# Patient Record
Sex: FEMALE | Race: WHITE | NOT HISPANIC OR LATINO | Employment: UNEMPLOYED | URBAN - METROPOLITAN AREA
[De-identification: names, ages, dates, MRNs, and addresses within clinical notes are randomized per-mention and may not be internally consistent; named-entity substitution may affect disease eponyms.]

---

## 2021-11-11 LAB
INFLUENZA A ANTIGEN, POC: NEGATIVE
INFLUENZA B ANTIGEN, POC: NEGATIVE
RAPID GROUP A STREP (OHS): NEGATIVE
SARS-COV-2 RNA RESP QL NAA+PROBE: NEGATIVE

## 2021-11-14 ENCOUNTER — HISTORICAL (OUTPATIENT)
Dept: ADMINISTRATIVE | Facility: HOSPITAL | Age: 41
End: 2021-11-14

## 2021-11-14 LAB — SARS-COV-2 RNA RESP QL NAA+PROBE: NEGATIVE

## 2022-04-10 ENCOUNTER — HISTORICAL (OUTPATIENT)
Dept: ADMINISTRATIVE | Facility: HOSPITAL | Age: 42
End: 2022-04-10

## 2022-04-29 VITALS
BODY MASS INDEX: 28.41 KG/M2 | HEIGHT: 69 IN | DIASTOLIC BLOOD PRESSURE: 69 MMHG | OXYGEN SATURATION: 99 % | WEIGHT: 191.81 LBS | SYSTOLIC BLOOD PRESSURE: 118 MMHG

## 2022-06-22 DIAGNOSIS — M25.561 RIGHT KNEE PAIN, UNSPECIFIED CHRONICITY: Primary | ICD-10-CM

## 2022-08-04 ENCOUNTER — OFFICE VISIT (OUTPATIENT)
Dept: ORTHOPEDICS | Facility: CLINIC | Age: 42
End: 2022-08-04
Payer: MEDICAID

## 2022-08-04 ENCOUNTER — HOSPITAL ENCOUNTER (OUTPATIENT)
Dept: RADIOLOGY | Facility: HOSPITAL | Age: 42
Discharge: HOME | End: 2022-08-04
Attending: NURSE PRACTITIONER
Payer: MEDICAID

## 2022-08-04 VITALS — HEIGHT: 69 IN | WEIGHT: 202.63 LBS | BODY MASS INDEX: 30.01 KG/M2

## 2022-08-04 DIAGNOSIS — M25.561 RIGHT KNEE PAIN, UNSPECIFIED CHRONICITY: ICD-10-CM

## 2022-08-04 DIAGNOSIS — M22.2X1 PATELLOFEMORAL PAIN SYNDROME OF RIGHT KNEE: Primary | ICD-10-CM

## 2022-08-04 DIAGNOSIS — M22.8X1 PATELLAR TRACKING DISORDER OF RIGHT KNEE: ICD-10-CM

## 2022-08-04 PROCEDURE — 3008F PR BODY MASS INDEX (BMI) DOCUMENTED: ICD-10-PCS | Mod: CPTII,,, | Performed by: NURSE PRACTITIONER

## 2022-08-04 PROCEDURE — 1160F PR REVIEW ALL MEDS BY PRESCRIBER/CLIN PHARMACIST DOCUMENTED: ICD-10-PCS | Mod: CPTII,,, | Performed by: NURSE PRACTITIONER

## 2022-08-04 PROCEDURE — 73564 X-RAY EXAM KNEE 4 OR MORE: CPT | Mod: TC,RT

## 2022-08-04 PROCEDURE — 1159F PR MEDICATION LIST DOCUMENTED IN MEDICAL RECORD: ICD-10-PCS | Mod: CPTII,,, | Performed by: NURSE PRACTITIONER

## 2022-08-04 PROCEDURE — 99213 OFFICE O/P EST LOW 20 MIN: CPT | Mod: PBBFAC | Performed by: NURSE PRACTITIONER

## 2022-08-04 PROCEDURE — 1159F MED LIST DOCD IN RCRD: CPT | Mod: CPTII,,, | Performed by: NURSE PRACTITIONER

## 2022-08-04 PROCEDURE — 3008F BODY MASS INDEX DOCD: CPT | Mod: CPTII,,, | Performed by: NURSE PRACTITIONER

## 2022-08-04 PROCEDURE — 99204 PR OFFICE/OUTPT VISIT, NEW, LEVL IV, 45-59 MIN: ICD-10-PCS | Mod: S$PBB,,, | Performed by: NURSE PRACTITIONER

## 2022-08-04 PROCEDURE — 1160F RVW MEDS BY RX/DR IN RCRD: CPT | Mod: CPTII,,, | Performed by: NURSE PRACTITIONER

## 2022-08-04 PROCEDURE — 99204 OFFICE O/P NEW MOD 45 MIN: CPT | Mod: S$PBB,,, | Performed by: NURSE PRACTITIONER

## 2022-08-04 RX ORDER — DICLOFENAC SODIUM 10 MG/G
4 GEL TOPICAL 3 TIMES DAILY
Qty: 100 G | Refills: 2 | Status: SHIPPED | OUTPATIENT
Start: 2022-08-04 | End: 2022-09-03

## 2022-08-04 NOTE — PROGRESS NOTES
"  Subjective:       New pt   Patient ID: Tammy Juarez is a 42 y.o. female. Non-smoker. Employment HX: , currently employed.    Seen [facility] ortho for same DX since n/a.   Chief Complaint: Pain of the Right Knee    HPI:    Left aching and shooting anterior knee pain.   Injury: twisted while running on uneven ground  Onset: several months ago fluctuates   Modifying Factors: worse with activity and improves with rest  Associated Symptoms: crepitus, decreased ROM and "popping"   Activity: pain mildly interferes with ADLs  and active lifestyle with exercise  Previous Treatments:  RX NSAIDs with some relief but symptoms returning  PMH: negative for contraindications for NSAID use  Family History: - OA    Note: Referred for right knee pain after twist injury without conservative treatments to date.     Current Outpatient Medications:     diclofenac sodium (VOLTAREN) 1 % Gel, Apply 4 g topically 3 (three) times daily., Disp: 100 g, Rfl: 2  Review of patient's allergies indicates:   Allergen Reactions    Penicillins Rash     No results found for: HGBA1C   Body mass index is 29.92 kg/m².   Vitals:    08/04/22 1019 08/04/22 1020   Weight: 91.9 kg (202 lb 9.6 oz)    Height: 5' 9" (1.753 m)    PainSc:   3   3      Review of Systems:  A ten-point review of systems was performed and is negative, except as mentioned above   Objective:   MSK Bilateral Knee  General:  apparent distress, no pain indicators, obesity  Inspection: No limping gait, RIGHT mild effusion, full weight bearing, no erythema, no contusion, RIGHT: mild quad deconditioning, varus deformity, anterior pelvic tilt with knee/hindfoot valgus when standing, and lateral patellar tracking with sitting  Palpation: RIGHT knee tenderness with peripatellar soft tissue and mild tenderness to patellar tendon, non-tender medial and lateral joint lines, quad tendon and tibial plateau  ROM:     Active Flexion / Extension (0-140):  0 / 124 painful ( R )  0 / 135 " non-painful ( L )  Strength:    Flexion  5 / 5 (R)   5 / 5 (L)   Extension  5 / 5 (R)   5 / 5 (L)  Special Testing:   IT Band Testing  o Clarisa's Test:    + (R)  -- (L)   Effusion Testing  o Ballotable Effusion:   -- (R)  -- (L)  o Fluid Wave:    -- (R)  -- (L)   Patellar Testing  o Patellar Grind:    -- (R)  -- (L)  o Patellar Facet Pain:   + (R)  -- (L)  o Apprehension:    + (R)  -- (L)  o Deep Squat  +  (R)  -- (L)   Meniscal Testing  o Abby's test:     -- (R)  -- (L)  o Thessaly's Test:     -- (R)  -- (L)   Ligament Testing  o ACL Anterior Drawer:   -- (R) -- (L)  o PCL Posterior Drawer:   -- (R) -- (L)  o LCL Varus Test:    -- (R) -- (L)  o MCL Valgus Test:   -- (R) -- (L)  Hip Exam normal  Ankle Exam normal  Neurovascular: Intact to light touch  Neuro/ Psych: Awake, alert, oriented, normal mood and affect  Lymphatic: No LAD  Skin/ Soft Tissue: no rash, skin intact  Cardio: no edema, vascular integrity noted  Resp: no increased respiratory effort noted  Assessment:       Imaging: X-ray 4 views of right knee ordered, reviewed and independently interpreted by me. Discussed with patient. No acute findings .    XR KNEE COMP 4 OR MORE VIEWS RIGHT 8/4/22  CLINICAL HISTORY:  Pain in right knee  COMPARISON:  None.  FINDINGS:  No acute displaced fractures or dislocations.  There is some narrowing of the medial compartment of the knee joint articular spaces are otherwise preserved with smooth articular surface surfaces  No blastic or lytic lesions.  Small suprapatellar effusion  Impression:  Degenerative changes.  Small suprapatellar effusion    EMR Review: completed with noted Referral documentation reviewed    1. 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 BMI 29.0-29.9,adult    4. Right knee pain, unspecified chronicity      Plan:       1. Ongoing education about DX and treatment recommendations including conservative treatments of daily HEP with TheraBand,  BMI reduction goal 5-10% of body weight (170# overall goal), muscle strengthening with use of stationary bike (RPM set at 80 or > with slow progression to goal of 40 minutes 3-4 times per week as tolerated), adequate vit D/C, glucosamine 1500 mg/day and daily acetaminophen 1000 mg 3 times/ day if able to tolerate.    2. Treatment plan: Mild knee arthritis with patellar tracking syndrome/ patellar-femorale syndrome Recommend starting initial conservative treatments including: soft knee splint, RX topical NSAID , HEP with TheraBand > 6 weeks and formal RX PT, instructed to contact insurance for provider.  If inadequate at f/u will consider VS injections.   3. Procedure: CSI v. VS injections discussed as treatment options in future if conservative treatments fail.   4. RX Medications: continue medications as RX per PCP; RX topical diclofenac as directed, medication precautions given.   5. RTC: 6 weeks, telemedicine visit    Toña REY    Timed Billing Note  Total Time Spent with Patient: 45 minutes  Visit Start Time: 1045  10 minutes spent prior to exam reviewing EMR, prior labs and x-rays.  25 minutes spent in exam with patient completing exam, obtaining history, educating on DX and treatment plan.  10 minutes spent after exam completing EMR documentation.   Visit End Time: 2790

## 2022-09-16 ENCOUNTER — HISTORICAL (OUTPATIENT)
Dept: ADMINISTRATIVE | Facility: HOSPITAL | Age: 42
End: 2022-09-16
Payer: MEDICAID

## 2022-09-22 ENCOUNTER — OFFICE VISIT (OUTPATIENT)
Dept: ORTHOPEDICS | Facility: CLINIC | Age: 42
End: 2022-09-22
Payer: MEDICAID

## 2022-09-22 VITALS — WEIGHT: 198.63 LBS | BODY MASS INDEX: 27.81 KG/M2 | HEIGHT: 71 IN

## 2022-09-22 DIAGNOSIS — M23.91 ACUTE INTERNAL DERANGEMENT OF RIGHT KNEE: Primary | ICD-10-CM

## 2022-09-22 DIAGNOSIS — M22.8X1 PATELLAR MALTRACKING, RIGHT: ICD-10-CM

## 2022-09-22 PROCEDURE — 3008F PR BODY MASS INDEX (BMI) DOCUMENTED: ICD-10-PCS | Mod: CPTII,,, | Performed by: NURSE PRACTITIONER

## 2022-09-22 PROCEDURE — 1160F PR REVIEW ALL MEDS BY PRESCRIBER/CLIN PHARMACIST DOCUMENTED: ICD-10-PCS | Mod: CPTII,,, | Performed by: NURSE PRACTITIONER

## 2022-09-22 PROCEDURE — 1159F MED LIST DOCD IN RCRD: CPT | Mod: CPTII,,, | Performed by: NURSE PRACTITIONER

## 2022-09-22 PROCEDURE — 1160F RVW MEDS BY RX/DR IN RCRD: CPT | Mod: CPTII,,, | Performed by: NURSE PRACTITIONER

## 2022-09-22 PROCEDURE — 99213 OFFICE O/P EST LOW 20 MIN: CPT | Mod: PBBFAC | Performed by: NURSE PRACTITIONER

## 2022-09-22 PROCEDURE — 1159F PR MEDICATION LIST DOCUMENTED IN MEDICAL RECORD: ICD-10-PCS | Mod: CPTII,,, | Performed by: NURSE PRACTITIONER

## 2022-09-22 PROCEDURE — 99214 PR OFFICE/OUTPT VISIT, EST, LEVL IV, 30-39 MIN: ICD-10-PCS | Mod: S$PBB,,, | Performed by: NURSE PRACTITIONER

## 2022-09-22 PROCEDURE — 99214 OFFICE O/P EST MOD 30 MIN: CPT | Mod: S$PBB,,, | Performed by: NURSE PRACTITIONER

## 2022-09-22 PROCEDURE — 3008F BODY MASS INDEX DOCD: CPT | Mod: CPTII,,, | Performed by: NURSE PRACTITIONER

## 2022-09-22 NOTE — PROGRESS NOTES
"  Subjective:       Follow up   Patient ID: Tammy Juarez is a 42 y.o. female. Non-smoker. Employment HX: , currently employed.    Seen [facility] ortho for same DX since n/a.   Chief Complaint: Pain of the Right Knee    HPI:    Left aching and shooting anterior knee pain.   Injury:  twisted while running on uneven ground  Onset: several months ago fluctuates   Modifying Factors: worse with activity and improves with rest  Associated Symptoms: crepitus, decreased ROM and "popping"   Activity: pain mildly interferes with ADLs  and active lifestyle with exercise  Previous Treatments:  RX NSAIDs with some relief but symptoms returning  PMH: negative for contraindications for NSAID use  Family History: - OA    Note: Referred for right knee pain after twist injury without conservative treatments to date.     Current Outpatient Medications:     diclofenac sodium (VOLTAREN) 1 % Gel, Apply 4 g topically 3 (three) times daily., Disp: 100 g, Rfl: 2  Review of patient's allergies indicates:   Allergen Reactions    Penicillins Rash     No results found for: HGBA1C   Body mass index is 27.7 kg/m².   Vitals:    09/22/22 0944 09/22/22 0945   Weight: 90.1 kg (198 lb 9.6 oz)    Height: 5' 11" (1.803 m)    PainSc:    5      Review of Systems:  A ten-point review of systems was performed and is negative, except as mentioned above   Objective:   MSK Bilateral Knee  General:  apparent distress, no pain indicators, obesity  Inspection: No limping gait, RIGHT mild effusion, full weight bearing, no erythema, no contusion, RIGHT: mild quad deconditioning, varus deformity, anterior pelvic tilt with knee/hindfoot valgus when standing, and lateral patellar tracking with sitting  Palpation: RIGHT knee tenderness with peripatellar soft tissue and mild tenderness to patellar tendon, non-tender medial and lateral joint lines, quad tendon and tibial plateau  ROM:    Active Flexion / Extension (0-140):  0 / 124 painful ( R )  0 / 135 " non-painful ( L )  Strength:   Flexion  5 / 5 (R)   5 / 5 (L)  Extension  5 / 5 (R)   5 / 5 (L)  Special Testing:  IT Band Testing  Clarisa's Test:    + (R)  -- (L)  Effusion Testing  Ballotable Effusion:   + (R)  -- (L)  Fluid Wave:    + (R)  -- (L)  Patellar Testing  Patellar Grind:    -- (R)  -- (L)  Patellar Facet Pain:   + (R)  -- (L)  Apprehension:    + (R)  -- (L)  Deep Squat  +  (R)  -- (L)  Meniscal Testing  Abby's test:     -- (R)  -- (L)  Thessaly's Test:     -- (R)  -- (L)  Ligament Testing  ACL Anterior Drawer:   -- (R) -- (L)  PCL Posterior Drawer:   -- (R) -- (L)  LCL Varus Test:    -- (R) -- (L)  MCL Valgus Test:   -- (R) -- (L)  Hip Exam normal  Ankle Exam normal  Neurovascular: Intact to light touch  Neuro/ Psych: Awake, alert, oriented, normal mood and affect  Lymphatic: No LAD  Skin/ Soft Tissue: no rash, skin intact  Assessment:       Imaging: X-ray 4 views of right knee ordered, reviewed and independently interpreted by me. Discussed with patient. No acute findings .    XR KNEE COMP 4 OR MORE VIEWS RIGHT 8/4/22  CLINICAL HISTORY:  Pain in right knee  COMPARISON:  None.  FINDINGS:  No acute displaced fractures or dislocations.  There is some narrowing of the medial compartment of the knee joint articular spaces are otherwise preserved with smooth articular surface surfaces  No blastic or lytic lesions.  Small suprapatellar effusion  Impression:  Degenerative changes.  Small suprapatellar effusion    EMR Review: completed with noted prior visit 8/4/22 reviewed.    1. Acute internal derangement of right knee    2. BMI 27.0-27.9,adult      Plan:       Ongoing education about DX and treatment recommendations including conservative treatments of daily HEP with TheraBand, BMI reduction goal 5-10% of body weight (170# overall goal), muscle strengthening with use of stationary bike (RPM set at 80 or > with slow progression to goal of 40 minutes 3-4 times per week as tolerated), adequate vit D/C,  glucosamine 1500 mg/day and daily acetaminophen 1000 mg 3 times/ day if able to tolerate.    Treatment plan: Mild knee arthritis with patellar tracking syndrome/ patellar-femorale syndrome MRI ordered s/t failed conservative treatments including: RX NSAID, formal RX PT, HEP > 3 months, and knee bracing with inadequate relief.  Patient continues with symptoms of meniscal injury with chronic effusion despite > 6 weeks treatment.  MRI required to evaluate need for surgical treatments.    Procedure: n/a  RX Medications: continue medications as RX per PCP  RTC: after imaging complete    Toña REY    Timed Billing Note  Total Time Spent with Patient: 30 minutes  Visit Start Time: 0945  10 minutes spent prior to visit reviewing EMR, prior labs and x-rays.  10 minutes spent in visit with patient completing exam, obtaining history, educating on DX and treatment plan.  10 minutes spent after visit completing EMR documentation.   Visit End Time: 1015

## 2022-10-19 ENCOUNTER — OFFICE VISIT (OUTPATIENT)
Dept: ORTHOPEDICS | Facility: CLINIC | Age: 42
End: 2022-10-19
Payer: MEDICAID

## 2022-10-19 VITALS — BODY MASS INDEX: 28.6 KG/M2 | WEIGHT: 199.81 LBS | HEIGHT: 70 IN

## 2022-10-19 DIAGNOSIS — M17.11 PRIMARY OSTEOARTHRITIS OF RIGHT KNEE: ICD-10-CM

## 2022-10-19 DIAGNOSIS — M67.51 PLICA OF KNEE, RIGHT: Primary | ICD-10-CM

## 2022-10-19 PROCEDURE — 1159F PR MEDICATION LIST DOCUMENTED IN MEDICAL RECORD: ICD-10-PCS | Mod: CPTII,,, | Performed by: NURSE PRACTITIONER

## 2022-10-19 PROCEDURE — 1160F PR REVIEW ALL MEDS BY PRESCRIBER/CLIN PHARMACIST DOCUMENTED: ICD-10-PCS | Mod: CPTII,,, | Performed by: NURSE PRACTITIONER

## 2022-10-19 PROCEDURE — 99214 OFFICE O/P EST MOD 30 MIN: CPT | Mod: S$PBB,,, | Performed by: NURSE PRACTITIONER

## 2022-10-19 PROCEDURE — 99213 OFFICE O/P EST LOW 20 MIN: CPT | Mod: PBBFAC | Performed by: NURSE PRACTITIONER

## 2022-10-19 PROCEDURE — 99214 PR OFFICE/OUTPT VISIT, EST, LEVL IV, 30-39 MIN: ICD-10-PCS | Mod: S$PBB,,, | Performed by: NURSE PRACTITIONER

## 2022-10-19 PROCEDURE — 1159F MED LIST DOCD IN RCRD: CPT | Mod: CPTII,,, | Performed by: NURSE PRACTITIONER

## 2022-10-19 PROCEDURE — 1160F RVW MEDS BY RX/DR IN RCRD: CPT | Mod: CPTII,,, | Performed by: NURSE PRACTITIONER

## 2022-10-19 NOTE — PROGRESS NOTES
"  Subjective:     -----  Follow up here for MRI results.   Patient ID: Tammy Juarez is a 42 y.o. female. Non-smoker. Employment HX: , currently employed.    Seen [facility] ortho for same DX since n/a.   Chief Complaint: Pain of the Right Knee    HPI:    Left aching and shooting anterior knee pain.   Injury:  twisted while running on uneven ground  Onset: several months ago fluctuates   Modifying Factors: worse with activity and improves with rest  Associated Symptoms: crepitus, decreased ROM and "popping"   Activity: pain mildly interferes with ADLs  and active lifestyle with exercise  Previous Treatments:  soft knee splint, HEP withTheraBand, RX NSAIDs, and RX PT completed 5 wks/ 2 xs per week d/c'd 9/14/22  with some relief but symptoms returning  PMH: negative for contraindications for NSAID use  Family History: - OA    Note: Referred for right knee pain after twist injury without conservative treatments to date.     Current Outpatient Medications:     diclofenac sodium (VOLTAREN) 1 % Gel, Apply 4 g topically 3 (three) times daily., Disp: 100 g, Rfl: 2  Review of patient's allergies indicates:   Allergen Reactions    Penicillins Rash     No results found for: HGBA1C   Body mass index is 28.67 kg/m².   Vitals:    10/19/22 1356   Weight: 90.6 kg (199 lb 12.8 oz)   Height: 5' 10" (1.778 m)   PainSc:   2      Review of Systems:  A ten-point review of systems was performed and is negative, except as mentioned above   Objective:   MSK Bilateral Knee  General:  apparent distress, no pain indicators, obesity  Inspection: No limping gait, no effusion, full weight bearing, no erythema, no contusion, no quad deconditioning, varus deformity and lateral patellar tracking with sitting  Palpation: RIGHT knee tenderness with peripatellar soft tissue and mild tenderness to patellar tendon, non-tender medial and lateral joint lines, quad tendon and tibial plateau  ROM:    Active Flexion / Extension (0-140):  0 / 124 " painful ( R )  0 / 135 non-painful ( L )  Strength:   Flexion  5 / 5 (R)   5 / 5 (L)  Extension  5 / 5 (R)   5 / 5 (L)  Special Testing:  IT Band Testing  Clarisa's Test:    + (R)  -- (L)  Effusion Testing  Ballotable Effusion:   -- (R)  -- (L)  Fluid Wave:    -- (R)  -- (L)  Patellar Testing  Patellar Grind:    -- (R)  -- (L)  Patellar Facet Pain:   + (R)  -- (L)  Apprehension:    + (R)  -- (L)  Deep Squat  +  (R)  -- (L)  Meniscal Testing  Abby's test:     -- (R)  -- (L)  Thessaly's Test:     -- (R)  -- (L)  Ligament Testing  ACL Anterior Drawer:   -- (R) -- (L)  PCL Posterior Drawer:   -- (R) -- (L)  LCL Varus Test:    -- (R) -- (L)  MCL Valgus Test:   -- (R) -- (L)  Hip Exam normal  Ankle Exam normal  Neurovascular: Intact to light touch  Neuro/ Psych: Awake, alert, oriented, normal mood and affect  Lymphatic: No LAD  Skin/ Soft Tissue: no rash, skin intact  Assessment:       Imaging: X-ray 4 views of right knee ordered, reviewed and independently interpreted by me. Discussed with patient. No acute findings .    XR KNEE COMP 4 OR MORE VIEWS RIGHT 8/4/22  CLINICAL HISTORY:  Pain in right knee  COMPARISON:  None.  FINDINGS:  No acute displaced fractures or dislocations.  There is some narrowing of the medial compartment of the knee joint articular spaces are otherwise preserved with smooth articular surface surfaces  No blastic or lytic lesions.  Small suprapatellar effusion  Impression:  Degenerative changes.  Small suprapatellar effusion    EMR Review: completed with noted prior visit 8/4/22 reviewed.    1. Plica of knee, right    2. Primary osteoarthritis of right knee    3. BMI 28.0-28.9,adult      Plan:       Ongoing education about DX and treatment recommendations including conservative treatments of daily HEP with TheraBand, BMI reduction goal 5-10% of body weight (180# overall goal), muscle strengthening with use of stationary bike (RPM set at 80 or > with slow progression to goal of 40 minutes 3-4 times  per week as tolerated), adequate vit D/C, glucosamine 1500 mg/day and daily acetaminophen 1000 mg 3 times/ day if able to tolerate.    Treatment plan: Mild knee arthritis Right  ortho res referral at patient request. Declines conservative treatments.     Procedure: n/a  RX Medications: continue medications as RX per PCP  RTC: next available appt. with ortho res.   NOTE: None    Toña REY    Timed Billing Note  Total Time Spent with Patient: 30 minutes .  Visit Start Time: 1445  10 minutes spent prior to visit reviewing EMR, prior labs and x-rays.  10 minutes spent in visit with patient completing exam, obtaining history, educating on DX and treatment plan.  10 minutes spent after visit completing EMR documentation.   Visit End Time: 1515

## 2022-11-21 ENCOUNTER — OFFICE VISIT (OUTPATIENT)
Dept: ORTHOPEDICS | Facility: CLINIC | Age: 42
End: 2022-11-21
Payer: MEDICAID

## 2022-11-21 VITALS — WEIGHT: 199 LBS | HEIGHT: 70 IN | BODY MASS INDEX: 28.49 KG/M2

## 2022-11-21 DIAGNOSIS — M17.11 PRIMARY OSTEOARTHRITIS OF RIGHT KNEE: Primary | ICD-10-CM

## 2022-11-21 PROCEDURE — 99213 OFFICE O/P EST LOW 20 MIN: CPT | Mod: PBBFAC

## 2022-11-21 PROCEDURE — 99204 OFFICE O/P NEW MOD 45 MIN: CPT | Mod: S$PBB,,, | Performed by: ORTHOPAEDIC SURGERY

## 2022-11-21 PROCEDURE — 99204 PR OFFICE/OUTPT VISIT, NEW, LEVL IV, 45-59 MIN: ICD-10-PCS | Mod: S$PBB,,, | Performed by: ORTHOPAEDIC SURGERY

## 2022-11-21 PROCEDURE — 1159F MED LIST DOCD IN RCRD: CPT | Mod: CPTII,,, | Performed by: ORTHOPAEDIC SURGERY

## 2022-11-21 PROCEDURE — 3008F PR BODY MASS INDEX (BMI) DOCUMENTED: ICD-10-PCS | Mod: CPTII,,, | Performed by: ORTHOPAEDIC SURGERY

## 2022-11-21 PROCEDURE — 3008F BODY MASS INDEX DOCD: CPT | Mod: CPTII,,, | Performed by: ORTHOPAEDIC SURGERY

## 2022-11-21 PROCEDURE — 1159F PR MEDICATION LIST DOCUMENTED IN MEDICAL RECORD: ICD-10-PCS | Mod: CPTII,,, | Performed by: ORTHOPAEDIC SURGERY

## 2022-11-21 NOTE — PROGRESS NOTES
ATTENDING ADDENDUM    Tammy Juarez  was evaluated at the time of the encounter with Dr Farah PGY5.  HPI, PE and treatment plan was reviewed. Treatment plan was reasonable and necessary. Imaging was reviewed at the time of visit.     I was present during critical or key resident-provided service and procedure portions of the visit.    Patient understands that future orthopaedic follow-up will be with [facility] orthopaedic staff and [facility] residents.

## 2022-11-21 NOTE — PROGRESS NOTES
\[facility]\"" Orthopaedic Surgery Clinic Note    In brief, Tammy Juarez is a 42 y.o. female who is a new patient with right knee pain     Patient states that since May she is been dealing with knee pain.  Pain is only present when she goes up and down stairs and squats deeply.  She has no pain when she ambulates.  Sometimes she has some stiffness when she gets up after a long period of being seated.  No numbness or tingling in the right lower extremity.  Patient states that she is been working with physical therapy.  The majority of these exercises have been addressing her hip abductors and flexors.  She is not been getting better.  Feels the pain most pronounced in the medial aspect of her knee.  No prior knee trauma.  No prior knee surgeries.  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  No prior injections      PMH: No past medical history on file.    PSH: No past surgical history on file.    SH:   Social History     Socioeconomic History    Marital status:    Tobacco Use    Smoking status: Heavy Smoker     Packs/day: 0.50     Types: Cigarettes    Smokeless tobacco: Never       FH: No family history on file.    Allergies:   Review of patient's allergies indicates:   Allergen Reactions    Penicillins Rash       ROS:  Constitutional- no fever, fatigue, weakness  Eye- no vision loss, eye redness, drainage, or pain  ENMT- no sore throat, ear pain, sinus pain/congestion, nasal congestion/drainage  Respiratory- no cough, wheezing, or shortness of breath  CV- no chest pain, no palpitations, no edema  GI- no N/V/D; no abdominal pain    Physical Exam:  There were no vitals filed for this visit.    General: NAD  Cardio: RRR by peripheral pulse  Pulm: Normal WOB on room air, symmetric chest rise  Abd: Soft, NT/ND    MSK:  No effusion   Mild tenderness along the medial tibiofemoral joint line   Tenderness along the medial patellofemoral joint line   Pain with patellar grind   No increased lateral patellar translation  with the knee at 30° of flexion   Motor intact L2-S1; 5/5 strength compared to contralateral lower extremity   No J-sign   Active knee range of motion 0-125   No varus or valgus instability at 0 and 30° of flexion  Negative anterior drawer  Negative posterior drawer  Lachman 1A  Negative Abby   Negative Stinchfield   Sensation intact to light touch distally  DP palpable    Imaging:   Radiographs and MRI of the right knee independently reviewed demonstrate full-thickness cartilage defect medial patellofemoral joint as well as the medial tibiofemoral joint; no evidence of MPFL disruption; slight lateral tilt of the patella; meniscus looks to be intact; cruciates intact    Assessment:  42-year-old female with patellofemoral and medial compartment right knee osteoarthritis    Discussed treatment options.  At this time we will pursue activity modification, anti-inflammatory medications and physical therapy.  Patient may follow-up as needed.  Discussed that if she fails to have significant relief in function and pain that we can discuss injections.  Definitive treatment for arthritis is total joint arthroplasty.  Counseled patient regarding weight loss.    Jim Farah MD  [facility] Orthopaedic Surgery  11/21/2022 11:56 AM